# Patient Record
Sex: MALE | Race: BLACK OR AFRICAN AMERICAN | Employment: UNEMPLOYED | URBAN - METROPOLITAN AREA
[De-identification: names, ages, dates, MRNs, and addresses within clinical notes are randomized per-mention and may not be internally consistent; named-entity substitution may affect disease eponyms.]

---

## 2018-01-21 ENCOUNTER — HOSPITAL ENCOUNTER (EMERGENCY)
Age: 43
Discharge: HOME | End: 2018-01-21
Attending: EMERGENCY MEDICINE

## 2018-01-21 VITALS
RESPIRATION RATE: 16 BRPM | HEART RATE: 91 BPM | SYSTOLIC BLOOD PRESSURE: 169 MMHG | OXYGEN SATURATION: 100 % | TEMPERATURE: 97.3 F | DIASTOLIC BLOOD PRESSURE: 114 MMHG

## 2018-01-21 DIAGNOSIS — S01.81XA FACIAL LACERATION, INITIAL ENCOUNTER: Primary | ICD-10-CM

## 2018-01-21 PROCEDURE — 6360000002 HC RX W HCPCS: Performed by: EMERGENCY MEDICINE

## 2018-01-21 PROCEDURE — 6370000000 HC RX 637 (ALT 250 FOR IP): Performed by: EMERGENCY MEDICINE

## 2018-01-21 PROCEDURE — 90715 TDAP VACCINE 7 YRS/> IM: CPT | Performed by: EMERGENCY MEDICINE

## 2018-01-21 PROCEDURE — G0381 LEV 2 HOSP TYPE B ED VISIT: HCPCS

## 2018-01-21 PROCEDURE — 90471 IMMUNIZATION ADMIN: CPT | Performed by: EMERGENCY MEDICINE

## 2018-01-21 PROCEDURE — 12011 RPR F/E/E/N/L/M 2.5 CM/<: CPT

## 2018-01-21 RX ORDER — IBUPROFEN 800 MG/1
800 TABLET ORAL ONCE
Status: COMPLETED | OUTPATIENT
Start: 2018-01-21 | End: 2018-01-21

## 2018-01-21 RX ORDER — ACETAMINOPHEN 500 MG
1000 TABLET ORAL ONCE
Status: COMPLETED | OUTPATIENT
Start: 2018-01-21 | End: 2018-01-21

## 2018-01-21 RX ADMIN — IBUPROFEN 800 MG: 800 TABLET, FILM COATED ORAL at 11:50

## 2018-01-21 RX ADMIN — ACETAMINOPHEN 1000 MG: 500 TABLET ORAL at 11:50

## 2018-01-21 RX ADMIN — TETANUS TOXOID, REDUCED DIPHTHERIA TOXOID AND ACELLULAR PERTUSSIS VACCINE, ADSORBED 0.5 ML: 5; 2.5; 8; 8; 2.5 SUSPENSION INTRAMUSCULAR at 11:49

## 2018-01-21 ASSESSMENT — PAIN DESCRIPTION - LOCATION: LOCATION: HEAD

## 2018-01-21 ASSESSMENT — ENCOUNTER SYMPTOMS
RHINORRHEA: 0
ABDOMINAL PAIN: 0
NAUSEA: 0
SORE THROAT: 0
VOMITING: 0
COUGH: 0

## 2018-01-21 ASSESSMENT — PAIN DESCRIPTION - DESCRIPTORS: DESCRIPTORS: ACHING

## 2018-01-21 ASSESSMENT — PAIN SCALES - GENERAL
PAINLEVEL_OUTOF10: 8
PAINLEVEL_OUTOF10: 3

## 2018-01-21 ASSESSMENT — PAIN DESCRIPTION - FREQUENCY: FREQUENCY: CONTINUOUS

## 2018-01-21 ASSESSMENT — PAIN DESCRIPTION - ONSET: ONSET: SUDDEN

## 2018-01-21 ASSESSMENT — PAIN DESCRIPTION - PAIN TYPE: TYPE: ACUTE PAIN

## 2018-01-21 ASSESSMENT — PAIN DESCRIPTION - ORIENTATION: ORIENTATION: ANTERIOR

## 2018-01-21 NOTE — ED PROVIDER NOTES
me      CRITICAL CARE: There was a high probability of clinically significant/life threatening deterioration in this patient's condition which required my urgent intervention. Total critical care time was  minutes. This excludes any time for separately reportable procedures.        [facility],   01/21/18 [facility],   01/21/18 20 [facility],   01/24/18 [id number]
tablet 1,000 mg    ibuprofen (ADVIL;MOTRIN) tablet 800 mg    Tetanus-Diphth-Acell Pertussis (BOOSTRIX) injection 0.5 mL       DIAGNOSTIC RESULTS / EMERGENCY DEPARTMENT COURSE / MDM / DIFFERENTIAL DIAGNOSIS     LABS:  No results found for this visit on 01/21/18. RADIOLOGY:  Non     EKG  None    All EKG's are interpreted by the Emergency Department Physician who either signs or Co-signs this chart in the absence of a cardiologist.    DIFFERENTIAL DIAGNOSIS:  Laceration, abrasion, hematoma, cellulitis    EMERGENCY DEPARTMENT COURSE & MDM:  61-year-old male presents with scalp/facial laceration. Patient's vitals and again for hypertension, otherwise within normal limits. Patient is afebrile. Physical exam remarkable for well-appearing male with an approximately 2 days 0.5 cm linear laceration to his right forehead. We'll plan to irrigate the laceration and close it with Dermabond. Please refer to procedure note below. Doubt hematoma or an abrasion as these do not fit the clinical picture. Doubt cellulitis, there is no surrounding erythema or warmth and no streaking. To treat patient's headache, tylenol and ibuprofen was ordered. Tetanus booster given, as he does not know the date of his last tetanus shot. At the conclusion of the procedure, discharge instructions and return precautions were given to the patient. Pre-hypertension/Hypertension: The patient has been informed that they may have pre-hypertension or hypertension based on a blood pressure reading in the emergency department. I have recommended that he call his primary care provider  or a physician of choice this week to arrange follow up for further evaluation of possible pre-hypertension or hypertension.     PROCEDURES:  PROCEDURE NOTE - LACERATION CLOSURE    PATIENT NAME: Baldwin Lefort  MEDICAL RECORD NO. [id number]  DATE: 1/21/2018  ATTENDING PHYSICIAN: Dr. Keke Chandler DIAGNOSIS: Laceration(s) as follows:  -Location: